# Patient Record
Sex: MALE
[De-identification: names, ages, dates, MRNs, and addresses within clinical notes are randomized per-mention and may not be internally consistent; named-entity substitution may affect disease eponyms.]

---

## 2022-04-19 PROBLEM — Z00.00 ENCOUNTER FOR PREVENTIVE HEALTH EXAMINATION: Status: ACTIVE | Noted: 2022-04-19

## 2022-05-11 ENCOUNTER — APPOINTMENT (OUTPATIENT)
Dept: PAIN MANAGEMENT | Facility: CLINIC | Age: 77
End: 2022-05-11
Payer: MEDICARE

## 2022-05-11 PROCEDURE — 64483 NJX AA&/STRD TFRM EPI L/S 1: CPT | Mod: RT

## 2022-05-11 PROCEDURE — 64484 NJX AA&/STRD TFRM EPI L/S EA: CPT | Mod: RT

## 2022-05-11 NOTE — PROCEDURE
[FreeTextEntry3] : Date of Service: 05/11/2022 \par \par Account: [id number]\par \par Patient: COLBY DONOHUE \par \par YOB: 1945\par \par Age: 76 year\par \par Surgeon:   Aguilar Powers DO\par \par Assistant:  None\par \par Pre-Operative Diagnosis:   Lumbosacral radiculopathy\par \par Post Operative Diagnosis:  Lumbosacral radiculopathy\par \par Procedure:             Right L4-L5, L5-S1 transforaminal epidural steroid injection under fluoroscopic guidance.\par \par Anesthesia:            MAC \par \par This procedure was carried out using fluoroscopic guidance.  The risks and benefits of the procedure were discussed extensively with the patient.  The consent of the patient was obtained and the following procedure was performed. The patient was placed in the prone position on the fluoroscopy table and the area was prepped and draped in a sterile fashion.  A timeout was performed with all essential staff present and the site and side were verified.\par \par The right L4-L5 neural foramen was then identified on right oblique "bobby dog" anatomical view at the 6 o' clock position using fluoroscopic guidance, and the area was marked. The overlying skin and subcutaneous structures were anesthetized using sterile technique with 1% Lidocaine.   A 22 gauge 5 inch spinal needle was directed toward the inferior (6 o'clock) position of the pedicle, which formed the roof of the identified foramen.  Once in the epidural space, after negative aspiration for heme and CSF, 1cc of Omnipaque contrast was injected to confirm epidural location and assess filling defects and rule out intravascular needle placement.\par \par Lumbar epidurogram showed no intravascular or intrathecal flow pattern.  No blood or CSF was aspirated.  Omnipaque spread medially in epidural space and outlined the exiting nerve root.\par \par After this, 2.5 cc of a mixture of 3 cc of preservative free normal saline plus 80mg of Kenalog was injected in the epidural space\par \par The right L5-S1 neural foramen and was then identified on right oblique "bobby dog" anatomical view at the 6 o' clock position using fluoroscopic guidance, and the area was marked.  The overlying skin and subcutaneous structures were anesthetized using sterile technique with 1% Lidocaine.   A 22 gauge 5 inch spinal needle was directed toward the inferior (6 o'clock) position of the pedicle, which formed the roof of the identified foramen.  Once in the epidural space, after negative aspiration for heme and CSF, 1cc of Omnipaque contrast was injected to confirm epidural location and assess filling defects and rule out intravascular needle placement. \par \par Lumbar epidurogram showed no intravascular or intrathecal flow pattern.  No blood or CSF was aspirated.  Omnipaque spread medially in epidural space and outlined the exiting nerve root. \par \par After this, the remainder of the injectate listed above was injected in the epidural space.\par \par The needle was subsequently removed.  Vital signs remained normal.  Pulse oximeter was used throughout the procedure and the patient's pulse and oxygen saturation remained within normal limits.  The patient tolerated the procedure well.  There were no complications.  The patient was instructed to apply ice over the injection sites for twenty minutes every two hours for the next 24 to 48 hours.\par \par Disposition:\par      1. The patient was advised to F/U in 1-2 weeks to assess the response to the injection.\par      2. The patient was also instructed to contact me immediately if there were any concerns related to the procedure performed.

## 2022-05-26 ENCOUNTER — APPOINTMENT (OUTPATIENT)
Dept: PAIN MANAGEMENT | Facility: CLINIC | Age: 77
End: 2022-05-26
Payer: MEDICARE

## 2022-05-26 VITALS — WEIGHT: 165 LBS | HEIGHT: 67 IN | BODY MASS INDEX: 25.9 KG/M2

## 2022-05-26 DIAGNOSIS — M54.16 RADICULOPATHY, LUMBAR REGION: ICD-10-CM

## 2022-05-26 DIAGNOSIS — M51.26 OTHER INTERVERTEBRAL DISC DISPLACEMENT, LUMBAR REGION: ICD-10-CM

## 2022-05-26 PROCEDURE — 99213 OFFICE O/P EST LOW 20 MIN: CPT

## 2022-05-26 NOTE — REASON FOR VISIT
[Follow-Up Visit] : a follow-up pain management visit [FreeTextEntry2] : lower back pain and follow up to injection

## 2022-05-26 NOTE — ASSESSMENT
[FreeTextEntry1] : A thorough discussion occurred regarding available pain management treatment options including interventional, rehabilitative, pharmacological, and alternative modalities with the patient. We will proceed with the following:\par \par Interventional treatment options:\par - None indicated at present time \par - could consider repeat right L4-L5, L5-S1 TFESI (80 mg Kenalog) with return of severe radicular pain\par \par Rehabilitative options:\par - Patient defers PT trial; continue HEP as tolerated\par \par Medication based treatment options:\par - continue Meloxicam 7.5 - 15 mg daily PRN\par - patient has largely d/c medication therapy following interventional therapy \par - see additional instructions below\par \par Complementary treatment options:\par - Weight management and lifestyle modifications discussed\par - See additional instructions below\par \par Additional treatment recommendations as follows:\par - Would repeat lumbar spine MRI with return of radicular pain \par - F/U in 3 months or PRN \par \par We have discussed the risks, benefits, and alternatives NSAID therapy including but not limited to the risk of bleeding, thrombosis, gastric mucosal irritation/ulceration, allergic reaction and kidney dysfunction; the patient verbalizes an understanding.\par \par The documentation recorded by the scribe, in my presence, accurately reflects the service I personally performed and the decisions made by me with my edits as appropriate.\par \par I, Zev Garcia acting as scribe, attest that this documentation has been prepared under the direction and in the presence of Provider Aguilar Powers DO.

## 2022-05-26 NOTE — PHYSICAL EXAM
[de-identified] : Constitutional: \par - No acute distress \par - Well developed; well nourished \par \par Neurological: \par - normal mood and affect \par - alert and oriented x 3  \par \par Cardiovascular: \par - grossly normal\par \par Lumbar Spine Exam: \par \par Inspection: \par erythema (-) \par ecchymosis (-) \par rashes (-) \par alignment: no scoliosis\par \par Palpation:  \par paraspinal tenderness:                  L (-) ; R (-) \par thoracic paraspinal tenderness:    L (-) ; R (-) \par sciatic nerve tenderness :             L (-) ; R (-) \par SI joint tenderness:                        L (-) ; R (-) \par GTB tenderness:                            L (-);  R (-) \par \par ROM:   \par Full ROM with mild stiffness \par \par Strength:                   Right       Left    \par Hip Flexion:                (5/5)       (5/5) \par Quadriceps:               (5/5)       (5/5) \par Hamstrings:                (5/5)       (5/5) \par Ankle Dorsiflexion:     (5/5)       (5/5) \par EHL:                            (5/5)       (5/5) \par Ankle Plantarflexion:  (5/5)       (5/5) \par \par \par Special Tests: \par SLR:                      R (-) ; L (-) \par Facet loading:       R (-) ; L (-) \par MIKAEL test:          R (-) ; L (-) \par XSLR:                   R (-) ; L (-) \par Yassine's test:        R (-) ; L(-) \par Tight Hamstrings   R (+) ; L (+) \par \par Neurologic: \par Light touch intact throughout LE \par Reflexes normal and symmetric \par \par Gait: \par mildly antalgic

## 2022-05-26 NOTE — HISTORY OF PRESENT ILLNESS
[Lower back] : lower back [Sudden] : sudden [2] : 2 [Dull/Aching] : dull/aching [Intermittent] : intermittent [Walking] : walking [FreeTextEntry1] : 05/26/22 - Patient presents for a FUV following an right L4-5, L5-S1 TFESI on 05/11/22. Patient reports >90% improvement over the course of interventional treatment. Patient is pleased with his response to the injection. Patient reports walking as a form of exercise. Patient reports he has d/c use of Meloxicam. \par \par 01/06/22 - Patient presents for a FUV following a Right L4-5, L5-S1 TFESI on 12/15/21. Patient reports that his most recent MARGARET provided 15 days of complete relief. Patient c/o pain in his anterior thigh. Patient rates his pain as a 3/10 currently. Patient reports that his pain never returned to its baseline. Patient has been taking meloxicam which he finds helpful in alleviating his pain. \par \par 09/07/21 - Patient reports for a FUV following a Right L4-5, L5-S1 TFESI on 08/20/21. Patient reports that he has stopped taking meloxicam after his TFESI. Patient reports some lower back pain radiating into his buttock, calf and knee. Patient reports a 70% improvement in his pain. \par \par 7/22/21 - FUV after right L4-L5 TFESI on 6/30/21. He reports 60-70% improvement of his right leg pain. Still radiates down the leg into the calf but intensity and frequency has reduced. Using meloxicam PRN with relief. \par \par 6/21/21 - Patient presents for initial evaluation. He c/o low back and right leg pain. Pain started suddenly 2 weeks ago. Pain radiates to the right thigh and calf. Focus of pain in the thigh. He distributes his pain 80% right leg and 20% low back. He reports subjective weakness in the right leg with occasional numbness in the thigh. On Lyrica 150 mg BID, no adverse side effects. He is unsure if he is obtaining meaningful relief with Lyrica. Occasionally uses Motrin PRN with minimal relief. \par \par Previous Injections: \par 1) Right L4-L5 TFESI (6/30/21)\par 2) Right L4-5, L5-S1 TFESI (08/20/21, 12/15/21, 05/11/22)\par \par Pertinent Surgical History: N/A\par \par Imaging:\par MRI Lumbar Spine (6/14/21) - O/C\par \par L1-L2: There is mild diffuse disc bulging.\par L2-L3: There is mild diffuse disc bulging.\par L3-L4: There is mild disc bulging, facet hypertrophy and mild bilateral foraminal narrowing.\par L4-L5: There is a right paracentral extrusion with cranial extension posterior to the L4 vertebral body impinging the right L4 and right L5 nerve roots in the central canal and within the right neural foramen. There is also a left foraminal protrusion impinging the left exiting L4 nerve root and there is mild central stenosis asymmetric on the right.\par L5-S1: There is a left paracentral protrusion encroaching upon left greater than right S1 nerve roots and mild disc bulging and facet hypertrophy.\par \par Physician Disclaimer: I have personally reviewed and confirmed all HPI data with the patient. [] : no

## 2022-08-05 ENCOUNTER — RX RENEWAL (OUTPATIENT)
Age: 77
End: 2022-08-05

## 2022-10-29 ENCOUNTER — RX RENEWAL (OUTPATIENT)
Age: 77
End: 2022-10-29

## 2022-10-29 RX ORDER — MELOXICAM 7.5 MG/1
7.5 TABLET ORAL
Qty: 30 | Refills: 2 | Status: ACTIVE | COMMUNITY
Start: 2022-05-03 | End: 1900-01-01

## 2023-01-26 ENCOUNTER — RX RENEWAL (OUTPATIENT)
Age: 78
End: 2023-01-26